# Patient Record
(demographics unavailable — no encounter records)

---

## 2024-10-21 NOTE — PLAN
[FreeTextEntry1] : What is a cough?  A cough is an important reflex that helps clear out the body's airways. The airways include the windpipe, or "trachea," and the bronchi, which are the tubes that carry air within the lungs. Coughing helps keep people from breathing things into the airways and lungs, which could cause problems (figure 1).  It is normal for people to cough once in a while. But sometimes, a cough is a symptom of an illness or condition.  Some coughs are called "dry" coughs, because they don't bring up mucus (phlegm). Other coughs are called "wet" or "productive" coughs, because they do bring up mucus. Some coughs are mild and don't cause serious problems. Other coughs are severe and can cause trouble breathing.  What causes a cough?  In adults, common causes of a cough include:  Viral infections - These include colds, the flu, and COVID-19. Usually, a cough caused by a viral infection will only last for a week or 2, but sometimes, it can last longer.  Smoking cigarettes or vaping  Postnasal drip - Postnasal drip is when mucus from the nose drips down or flows along the back of the throat. Postnasal drip can happen when people have:  -A cold  -Allergies  -A sinus infection  Lung conditions - Lung problems like asthma and chronic obstructive pulmonary disease ("COPD") can make it hard to breathe. COPD is usually caused by smoking.  Acid reflux - Acid reflux is when the acid that is normally in your stomach backs up into your esophagus (the tube that carries food from your mouth to your stomach).  A side effect from blood pressure medicines called "ACE inhibitors"  Will I need tests?  Maybe. If you see a doctor for your cough, they will talk with you and do an exam. Based on your symptoms and other factors, they might decide that you need tests. These might include:  A swab from your inside of your nose - This can be tested for the virus that causes COVID-19.  A chest X-ray  Breathing tests - Breathing tests involve breathing hard into a tube. These tests show how your lungs are working.  Allergy skin tests to find out what you're allergic to - For a skin test, the doctor puts a drop of the substance that you might be allergic to on your skin and makes a tiny prick in your skin. Then, they watch your skin to see if it gets red and bumpy.  A CT scan of your chest or sinuses - A CT scan is an imaging test that creates pictures of the inside of the body.  Lab tests on a sample of the mucus that you cough up  Using a "scope" to look inside of your nose, sinuses, airway, or lungs  Tests to check for acid reflux - These usually involve having a thin tube put in your mouth and down into your esophagus.  How can I care for myself at home?  If your cough is from a cold, you can use a cool mist humidifier in the room where you sleep. Some people find that this helps.  Suck on cough drops or hard candy.  Drink warm liquids, like tea, to soothe your throat.  Avoid smoking and places where other people are smoking.  If you have allergies, avoid the things that you are allergic to. This might include pollen, dust, animals, or mold.  If you are coughing up mucus, try an over-the-counter cold and cough medicine. These medicines can thin mucus and sometimes reduce the urge to cough.  If you have acid reflux, your doctor or nurse will talk to you about how to reduce symptoms.  How is a cough treated?  Treatment depends on the cause of your cough. For example:  Some infections are treated with antibiotic medicines. If an infection is caused by bacteria, doctors can treat it with antibiotics. If the infection is caused by a virus (such as a cold), antibiotics will not help. For some viral infections, like the flu or COVID-19, there might be other medicines that can help.  Postnasal drip is treated with different kinds of medicines that can come as a pill or nose spray.  Asthma and COPD are usually treated with medicines that people breathe into their lungs. These are called "inhaler medicines."  Acid reflux can be treated with medicine to reduce or block stomach acid.  If you have a cough as a side effect from an ACE inhibitor, your doctor can switch your medicine.  If the cause of your cough is not clear, your doctor might prescribe medicine to make your cough less severe. But these medicines have side effects, and doctors usually recommend them only if nothing else has worked.  When should I call the doctor?  Call your doctor or nurse if:  You have trouble breathing or noisy breathing (wheezing).  You have a fever or chest pain.  You cough up blood, or yellow or green mucus.  You cough so hard that it makes you throw up.  Your cough gets worse or lasts longer than 14 days.  You have a cough and have lost weight without trying to.

## 2024-10-21 NOTE — PHYSICAL EXAM
[Normal Appearance] : was normal in appearance [Swelling] : was swollen [Tenderness] : was tender [Normal] : normal rate, regular rhythm, normal S1 and S2 and no murmur heard [Cervical Lymph Nodes Enlarged Anterior Bilaterally] : enlarged nodes bilaterally

## 2024-10-21 NOTE — HISTORY OF PRESENT ILLNESS
[Cough] : cough [Mild] : mild [___ Weeks ago] :  [unfilled] weeks ago [Episodic] : episodic  [FreeTextEntry8] : [facility] with c/o cough for "a few weeks".  states had a common cold a few weeks ago and still has a lingering cough.  states feels short of breath when coughing at times.  Denies fever, chills, fatigue.